# Patient Record
Sex: FEMALE | URBAN - NONMETROPOLITAN AREA
[De-identification: names, ages, dates, MRNs, and addresses within clinical notes are randomized per-mention and may not be internally consistent; named-entity substitution may affect disease eponyms.]

---

## 2022-05-13 ENCOUNTER — APPOINTMENT (OUTPATIENT)
Age: 57
Setting detail: DERMATOLOGY
End: 2022-05-17

## 2022-05-13 VITALS — HEIGHT: 60 IN | WEIGHT: 132 LBS

## 2022-05-13 DIAGNOSIS — L85.3 XEROSIS CUTIS: ICD-10-CM

## 2022-05-13 DIAGNOSIS — L82.0 INFLAMED SEBORRHEIC KERATOSIS: ICD-10-CM

## 2022-05-13 PROCEDURE — OTHER COUNSELING: OTHER

## 2022-05-13 PROCEDURE — OTHER REASSURANCE: OTHER

## 2022-05-13 PROCEDURE — OTHER MIPS QUALITY: OTHER

## 2022-05-13 PROCEDURE — OTHER PRESCRIPTION: OTHER

## 2022-05-13 PROCEDURE — OTHER LIQUID NITROGEN: OTHER

## 2022-05-13 PROCEDURE — 99202 OFFICE O/P NEW SF 15 MIN: CPT | Mod: 25

## 2022-05-13 PROCEDURE — 17110 DESTRUCT B9 LESION 1-14: CPT

## 2022-05-13 RX ORDER — AMMONIUM LACTATE 12 G/100G
12% LOTION TOPICAL QD
Qty: 400 | Refills: 3 | Status: ERX | COMMUNITY
Start: 2022-05-13

## 2022-05-13 ASSESSMENT — LOCATION SIMPLE DESCRIPTION DERM
LOCATION SIMPLE: LEFT UPPER ARM
LOCATION SIMPLE: RIGHT UPPER ARM

## 2022-05-13 ASSESSMENT — LOCATION ZONE DERM: LOCATION ZONE: ARM

## 2022-05-13 ASSESSMENT — LOCATION DETAILED DESCRIPTION DERM
LOCATION DETAILED: RIGHT ANTERIOR DISTAL UPPER ARM
LOCATION DETAILED: LEFT ANTERIOR DISTAL UPPER ARM
LOCATION DETAILED: LEFT ANTERIOR PROXIMAL UPPER ARM
LOCATION DETAILED: RIGHT ANTERIOR MEDIAL PROXIMAL UPPER ARM

## 2022-05-13 NOTE — PROCEDURE: MIPS QUALITY
Quality 130: Documentation Of Current Medications In The Medical Record: Current Medications Documented
Detail Level: Detailed
Quality 226: Preventive Care And Screening: Tobacco Use: Screening And Cessation Intervention: Patient screened for tobacco use, is a smoker AND received Cessation Counseling
Quality 110: Preventive Care And Screening: Influenza Immunization: Influenza Immunization not Administered because Patient Refused.

## 2022-05-13 NOTE — PROCEDURE: LIQUID NITROGEN
Show Spray Paint Technique Variable?: Yes
Medical Necessity Clause: This procedure was medically necessary because the lesions that were treated were:
Detail Level: Detailed
Spray Paint Text: The liquid nitrogen was applied to the skin utilizing a spray paint frosting technique.
Add 52 Modifier (Optional): no
Consent: The patient's consent was obtained including but not limited to risks of crusting, scabbing, blistering, scarring, darker or lighter pigmentary change, recurrence, incomplete removal and infection.
Post-Care Instructions: I reviewed with the patient in detail post-care instructions. Patient is to wear sunprotection, and avoid picking at any of the treated lesions. Pt may apply Vaseline to crusted or scabbing areas.
Medical Necessity Information: It is in your best interest to select a reason for this procedure from the list below. All of these items fulfill various CMS LCD requirements except the new and changing color options.

## 2022-06-24 ENCOUNTER — APPOINTMENT (OUTPATIENT)
Age: 57
Setting detail: DERMATOLOGY
End: 2022-06-30

## 2022-06-24 DIAGNOSIS — L81.4 OTHER MELANIN HYPERPIGMENTATION: ICD-10-CM

## 2022-06-24 DIAGNOSIS — B07.8 OTHER VIRAL WARTS: ICD-10-CM

## 2022-06-24 PROCEDURE — OTHER ADDITIONAL NOTES: OTHER

## 2022-06-24 PROCEDURE — OTHER COUNSELING: OTHER

## 2022-06-24 PROCEDURE — OTHER REASSURANCE: OTHER

## 2022-06-24 PROCEDURE — OTHER PRESCRIPTION MEDICATION MANAGEMENT: OTHER

## 2022-06-24 PROCEDURE — 99213 OFFICE O/P EST LOW 20 MIN: CPT

## 2022-06-24 ASSESSMENT — LOCATION DETAILED DESCRIPTION DERM
LOCATION DETAILED: RIGHT PROXIMAL PRETIBIAL REGION
LOCATION DETAILED: RIGHT ANTERIOR DISTAL UPPER ARM
LOCATION DETAILED: LEFT DISTAL DORSAL FOREARM
LOCATION DETAILED: RIGHT ANTERIOR PROXIMAL UPPER ARM
LOCATION DETAILED: LEFT ANTERIOR PROXIMAL UPPER ARM
LOCATION DETAILED: LEFT PROXIMAL PRETIBIAL REGION
LOCATION DETAILED: LEFT PROXIMAL DORSAL FOREARM
LOCATION DETAILED: LEFT PLANTAR FOREFOOT OVERLYING 2ND METATARSAL
LOCATION DETAILED: RIGHT DISTAL PRETIBIAL REGION
LOCATION DETAILED: LEFT DISTAL PRETIBIAL REGION
LOCATION DETAILED: LEFT ANTERIOR DISTAL UPPER ARM
LOCATION DETAILED: RIGHT PROXIMAL DORSAL FOREARM
LOCATION DETAILED: LEFT ANTERIOR PROXIMAL UPPER ARM
LOCATION DETAILED: LEFT DISTAL PRETIBIAL REGION
LOCATION DETAILED: RIGHT PROXIMAL PRETIBIAL REGION
LOCATION DETAILED: LEFT PROXIMAL PRETIBIAL REGION
LOCATION DETAILED: LEFT PLANTAR FOREFOOT OVERLYING 2ND METATARSAL
LOCATION DETAILED: RIGHT ANTERIOR PROXIMAL UPPER ARM
LOCATION DETAILED: RIGHT PROXIMAL DORSAL FOREARM
LOCATION DETAILED: LEFT ANTERIOR DISTAL UPPER ARM
LOCATION DETAILED: LEFT MEDIAL PLANTAR MIDFOOT
LOCATION DETAILED: LEFT PROXIMAL DORSAL FOREARM

## 2022-06-24 ASSESSMENT — LOCATION SIMPLE DESCRIPTION DERM
LOCATION SIMPLE: RIGHT UPPER ARM
LOCATION SIMPLE: LEFT PRETIBIAL REGION
LOCATION SIMPLE: RIGHT PRETIBIAL REGION
LOCATION SIMPLE: LEFT FOREARM
LOCATION SIMPLE: LEFT PLANTAR SURFACE
LOCATION SIMPLE: RIGHT PRETIBIAL REGION
LOCATION SIMPLE: RIGHT FOREARM
LOCATION SIMPLE: LEFT PLANTAR SURFACE
LOCATION SIMPLE: LEFT UPPER ARM
LOCATION SIMPLE: RIGHT FOREARM
LOCATION SIMPLE: LEFT PRETIBIAL REGION
LOCATION SIMPLE: LEFT FOREARM
LOCATION SIMPLE: RIGHT UPPER ARM
LOCATION SIMPLE: LEFT UPPER ARM

## 2022-06-24 ASSESSMENT — LOCATION ZONE DERM
LOCATION ZONE: LEG
LOCATION ZONE: FEET
LOCATION ZONE: LEG
LOCATION ZONE: ARM
LOCATION ZONE: ARM
LOCATION ZONE: FEET

## 2022-06-24 NOTE — HPI: WART (PATIENT REPORTED)
Where Is Your Wart Located?: Bottom of left foot
Additional Comments (Use Complete Sentences): Patient has been using bleach baths, otc salicylic acid and samples of cordon tape

## 2022-06-24 NOTE — PROCEDURE: ADDITIONAL NOTES
Detail Level: Simple
Render Risk Assessment In Note?: no
Additional Notes: Pt advised to purchase OTC Black and White Bleaching Cream to affected areas daily. If no improvement by next visit we may have to treat lesions with cryotherapy at next visit

## 2023-10-27 ENCOUNTER — APPOINTMENT (OUTPATIENT)
Dept: URBAN - NONMETROPOLITAN AREA SURGERY 10 | Age: 58
Setting detail: DERMATOLOGY
End: 2023-10-31

## 2023-10-27 DIAGNOSIS — L29.8 OTHER PRURITUS: ICD-10-CM

## 2023-10-27 DIAGNOSIS — L82.0 INFLAMED SEBORRHEIC KERATOSIS: ICD-10-CM

## 2023-10-27 DIAGNOSIS — L23.9 ALLERGIC CONTACT DERMATITIS, UNSPECIFIED CAUSE: ICD-10-CM

## 2023-10-27 PROCEDURE — 99213 OFFICE O/P EST LOW 20 MIN: CPT | Mod: 25

## 2023-10-27 PROCEDURE — OTHER LIQUID NITROGEN: OTHER

## 2023-10-27 PROCEDURE — 17110 DESTRUCT B9 LESION 1-14: CPT

## 2023-10-27 PROCEDURE — OTHER PRESCRIPTION: OTHER

## 2023-10-27 PROCEDURE — OTHER MIPS QUALITY: OTHER

## 2023-10-27 PROCEDURE — OTHER COUNSELING: OTHER

## 2023-10-27 RX ORDER — FEXOFENADINE HYDROCHLORIDE 180 MG/1
1 TAB TABLET ORAL QD
Qty: 30 | Refills: 2 | Status: ERX | COMMUNITY
Start: 2023-10-27

## 2023-10-27 RX ORDER — TRIAMCINOLONE ACETONIDE 1 MG/G
AS DIRECTED CREAM TOPICAL BID
Qty: 454 | Refills: 1 | Status: ERX | COMMUNITY
Start: 2023-10-27

## 2023-10-27 RX ORDER — KETOCONAZOLE 20 MG/ML
2% SHAMPOO, SUSPENSION TOPICAL
Qty: 120 | Refills: 2 | Status: ERX | COMMUNITY
Start: 2023-10-27

## 2023-10-27 ASSESSMENT — LOCATION ZONE DERM
LOCATION ZONE: LEG
LOCATION ZONE: ARM
LOCATION ZONE: ARM

## 2023-10-27 ASSESSMENT — LOCATION DETAILED DESCRIPTION DERM
LOCATION DETAILED: LEFT DISTAL CALF
LOCATION DETAILED: RIGHT ANTERIOR PROXIMAL UPPER ARM
LOCATION DETAILED: LEFT ANTERIOR PROXIMAL UPPER ARM
LOCATION DETAILED: LEFT DISTAL PRETIBIAL REGION
LOCATION DETAILED: RIGHT POSTERIOR ANKLE
LOCATION DETAILED: RIGHT DISTAL PRETIBIAL REGION
LOCATION DETAILED: RIGHT PROXIMAL PRETIBIAL REGION
LOCATION DETAILED: LEFT PROXIMAL PRETIBIAL REGION
LOCATION DETAILED: LEFT ANTERIOR PROXIMAL UPPER ARM
LOCATION DETAILED: RIGHT ANTERIOR PROXIMAL UPPER ARM
LOCATION DETAILED: RIGHT PROXIMAL CALF
LOCATION DETAILED: LEFT PROXIMAL CALF

## 2023-10-27 ASSESSMENT — LOCATION SIMPLE DESCRIPTION DERM
LOCATION SIMPLE: RIGHT CALF
LOCATION SIMPLE: LEFT UPPER ARM
LOCATION SIMPLE: RIGHT UPPER ARM
LOCATION SIMPLE: RIGHT UPPER ARM
LOCATION SIMPLE: LEFT CALF
LOCATION SIMPLE: LEFT PRETIBIAL REGION
LOCATION SIMPLE: LEFT UPPER ARM
LOCATION SIMPLE: RIGHT PRETIBIAL REGION
LOCATION SIMPLE: RIGHT ANKLE

## 2023-10-27 NOTE — PROCEDURE: LIQUID NITROGEN
Show Aperture Variable?: Yes
Spray Paint Technique: No
Detail Level: Detailed
Spray Paint Text: The liquid nitrogen was applied to the skin utilizing a spray paint frosting technique.
Post-Care Instructions: I reviewed with the patient in detail post-care instructions. Patient is to wear sunprotection, and avoid picking at any of the treated lesions. Pt may apply Vaseline to crusted or scabbing areas.
Medical Necessity Information: It is in your best interest to select a reason for this procedure from the list below. All of these items fulfill various CMS LCD requirements except the new and changing color options.
Application Tool (Optional): Cotton Tipped Applicator
Consent: The patient's consent was obtained including but not limited to risks of crusting, scabbing, blistering, scarring, darker or lighter pigmentary change, recurrence, incomplete removal and infection.
Medical Necessity Clause: This procedure was medically necessary because the lesions that were treated were:

## 2023-11-10 ENCOUNTER — APPOINTMENT (OUTPATIENT)
Dept: URBAN - NONMETROPOLITAN AREA SURGERY 10 | Age: 58
Setting detail: DERMATOLOGY
End: 2023-11-10

## 2023-11-10 DIAGNOSIS — L81.4 OTHER MELANIN HYPERPIGMENTATION: ICD-10-CM

## 2023-11-10 PROCEDURE — OTHER COUNSELING: OTHER

## 2023-11-10 PROCEDURE — OTHER REASSURANCE: OTHER

## 2023-11-10 PROCEDURE — 99212 OFFICE O/P EST SF 10 MIN: CPT

## 2023-11-10 ASSESSMENT — LOCATION SIMPLE DESCRIPTION DERM
LOCATION SIMPLE: RIGHT FOREARM
LOCATION SIMPLE: LEFT FOREARM
LOCATION SIMPLE: RIGHT ELBOW

## 2023-11-10 ASSESSMENT — LOCATION ZONE DERM: LOCATION ZONE: ARM

## 2023-11-10 ASSESSMENT — LOCATION DETAILED DESCRIPTION DERM
LOCATION DETAILED: LEFT DISTAL DORSAL FOREARM
LOCATION DETAILED: RIGHT LATERAL ELBOW
LOCATION DETAILED: LEFT PROXIMAL DORSAL FOREARM
LOCATION DETAILED: RIGHT PROXIMAL DORSAL FOREARM

## 2023-12-01 ENCOUNTER — APPOINTMENT (OUTPATIENT)
Dept: URBAN - NONMETROPOLITAN AREA SURGERY 10 | Age: 58
Setting detail: DERMATOLOGY
End: 2023-12-01

## 2023-12-01 DIAGNOSIS — L92.0 GRANULOMA ANNULARE: ICD-10-CM

## 2023-12-01 DIAGNOSIS — L82.0 INFLAMED SEBORRHEIC KERATOSIS: ICD-10-CM

## 2023-12-01 DIAGNOSIS — L91.8 OTHER HYPERTROPHIC DISORDERS OF THE SKIN: ICD-10-CM

## 2023-12-01 PROCEDURE — 99212 OFFICE O/P EST SF 10 MIN: CPT | Mod: 25

## 2023-12-01 PROCEDURE — OTHER COUNSELING: OTHER

## 2023-12-01 PROCEDURE — 17110 DESTRUCT B9 LESION 1-14: CPT

## 2023-12-01 PROCEDURE — OTHER LIQUID NITROGEN: OTHER

## 2023-12-01 ASSESSMENT — LOCATION ZONE DERM
LOCATION ZONE: TRUNK
LOCATION ZONE: ARM
LOCATION ZONE: NECK
LOCATION ZONE: LEG
LOCATION ZONE: EYELID

## 2023-12-01 ASSESSMENT — LOCATION DETAILED DESCRIPTION DERM
LOCATION DETAILED: RIGHT ANTERIOR PROXIMAL THIGH
LOCATION DETAILED: LEFT SUPERIOR LATERAL LOWER BACK
LOCATION DETAILED: RIGHT INFERIOR ANTERIOR NECK
LOCATION DETAILED: LEFT INFERIOR LATERAL NECK
LOCATION DETAILED: LEFT ANTERIOR DISTAL UPPER ARM
LOCATION DETAILED: RIGHT ANTERIOR DISTAL THIGH
LOCATION DETAILED: LEFT LATERAL UPPER BACK
LOCATION DETAILED: LEFT LATERAL CANTHUS

## 2023-12-01 ASSESSMENT — LOCATION SIMPLE DESCRIPTION DERM
LOCATION SIMPLE: RIGHT ANTERIOR NECK
LOCATION SIMPLE: LEFT EYELID
LOCATION SIMPLE: LEFT ANTERIOR NECK
LOCATION SIMPLE: LEFT UPPER ARM
LOCATION SIMPLE: RIGHT THIGH
LOCATION SIMPLE: LEFT UPPER BACK
LOCATION SIMPLE: LEFT LOWER BACK

## 2023-12-01 NOTE — HPI: SECONDARY COMPLAINT
How Severe Is This Condition?: moderate
Additional History: Pt has some SKs she would like taken off today

## 2023-12-01 NOTE — PROCEDURE: LIQUID NITROGEN
Post-Care Instructions: I reviewed with the patient in detail post-care instructions. Patient is to wear sunprotection, and avoid picking at any of the treated lesions. Pt may apply Vaseline to crusted or scabbing areas.
Add 52 Modifier (Optional): no
Show Aperture Variable?: Yes
Application Tool (Optional): Cotton Tipped Applicator
Spray Paint Text: The liquid nitrogen was applied to the skin utilizing a spray paint frosting technique.
Medical Necessity Clause: This procedure was medically necessary because the lesions that were treated were:
Detail Level: Detailed
Consent: The patient's consent was obtained including but not limited to risks of crusting, scabbing, blistering, scarring, darker or lighter pigmentary change, recurrence, incomplete removal and infection.
Medical Necessity Information: It is in your best interest to select a reason for this procedure from the list below. All of these items fulfill various CMS LCD requirements except the new and changing color options.

## 2024-01-26 ENCOUNTER — RX ONLY (RX ONLY)
Age: 59
End: 2024-01-26

## 2024-01-26 ENCOUNTER — APPOINTMENT (OUTPATIENT)
Dept: URBAN - NONMETROPOLITAN AREA SURGERY 10 | Age: 59
Setting detail: DERMATOLOGY
End: 2024-01-29

## 2024-01-26 DIAGNOSIS — L82.0 INFLAMED SEBORRHEIC KERATOSIS: ICD-10-CM

## 2024-01-26 DIAGNOSIS — L85.3 XEROSIS CUTIS: ICD-10-CM

## 2024-01-26 DIAGNOSIS — Z71.89 OTHER SPECIFIED COUNSELING: ICD-10-CM

## 2024-01-26 PROCEDURE — OTHER PRESCRIPTION MEDICATION MANAGEMENT: OTHER

## 2024-01-26 PROCEDURE — 99212 OFFICE O/P EST SF 10 MIN: CPT | Mod: 25

## 2024-01-26 PROCEDURE — OTHER COUNSELING: OTHER

## 2024-01-26 PROCEDURE — OTHER LIQUID NITROGEN: OTHER

## 2024-01-26 PROCEDURE — 17111 DESTRUCT LESION 15 OR MORE: CPT

## 2024-01-26 RX ORDER — AMMONIUM LACTATE 12 G/100G
12% LOTION TOPICAL QD
Qty: 400 | Refills: 3 | Status: ERX | COMMUNITY
Start: 2024-01-26

## 2024-01-26 RX ORDER — KETOCONAZOLE 20 MG/ML
2% SHAMPOO, SUSPENSION TOPICAL
Qty: 120 | Refills: 2 | Status: ERX

## 2024-01-26 ASSESSMENT — LOCATION DETAILED DESCRIPTION DERM
LOCATION DETAILED: LEFT VENTRAL DISTAL FOREARM
LOCATION DETAILED: RIGHT RADIAL DORSAL HAND
LOCATION DETAILED: RIGHT LATERAL DORSAL FOOT
LOCATION DETAILED: RIGHT PROXIMAL MEDIAL POSTERIOR UPPER ARM
LOCATION DETAILED: RIGHT DORSAL FOOT
LOCATION DETAILED: LEFT VENTRAL PROXIMAL FOREARM
LOCATION DETAILED: RIGHT ANTERIOR MEDIAL PROXIMAL UPPER ARM
LOCATION DETAILED: RIGHT ANTERIOR PROXIMAL UPPER ARM
LOCATION DETAILED: RIGHT VENTRAL PROXIMAL FOREARM
LOCATION DETAILED: RIGHT PROXIMAL POSTERIOR UPPER ARM

## 2024-01-26 ASSESSMENT — LOCATION SIMPLE DESCRIPTION DERM
LOCATION SIMPLE: RIGHT FOREARM
LOCATION SIMPLE: RIGHT HAND
LOCATION SIMPLE: RIGHT FOOT
LOCATION SIMPLE: LEFT FOREARM
LOCATION SIMPLE: RIGHT POSTERIOR UPPER ARM
LOCATION SIMPLE: RIGHT UPPER ARM

## 2024-01-26 ASSESSMENT — LOCATION ZONE DERM
LOCATION ZONE: ARM
LOCATION ZONE: FEET
LOCATION ZONE: HAND

## 2024-01-26 NOTE — PROCEDURE: LIQUID NITROGEN
Post-Care Instructions: I reviewed with the patient in detail post-care instructions. Patient is to wear sunprotection, and avoid picking at any of the treated lesions. Pt may apply Vaseline to crusted or scabbing areas.
Spray Paint Technique: No
Show Spray Paint Technique Variable?: Yes
Detail Level: Detailed
Medical Necessity Clause: This procedure was medically necessary because the lesions that were treated were:
Consent: The patient's consent was obtained including but not limited to risks of crusting, scabbing, blistering, scarring, darker or lighter pigmentary change, recurrence, incomplete removal and infection.
Medical Necessity Information: It is in your best interest to select a reason for this procedure from the list below. All of these items fulfill various CMS LCD requirements except the new and changing color options.
Spray Paint Text: The liquid nitrogen was applied to the skin utilizing a spray paint frosting technique.
Application Tool (Optional): Cotton Tipped Applicator

## 2024-01-26 NOTE — PROCEDURE: PRESCRIPTION MEDICATION MANAGEMENT
Continue Regimen: Ammonium lactate (refill sent today)
Detail Level: Zone
Render In Strict Bullet Format?: No